# Patient Record
Sex: MALE | Race: WHITE | NOT HISPANIC OR LATINO | Employment: UNEMPLOYED | URBAN - METROPOLITAN AREA
[De-identification: names, ages, dates, MRNs, and addresses within clinical notes are randomized per-mention and may not be internally consistent; named-entity substitution may affect disease eponyms.]

---
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
## 2020-11-07 ENCOUNTER — OFFICE VISIT (OUTPATIENT)
Dept: URGENT CARE | Facility: CLINIC | Age: 20
End: 2020-11-07
Payer: COMMERCIAL

## 2020-11-07 VITALS
HEIGHT: 74 IN | SYSTOLIC BLOOD PRESSURE: 106 MMHG | BODY MASS INDEX: 20.53 KG/M2 | HEART RATE: 60 BPM | TEMPERATURE: 97.8 F | WEIGHT: 160 LBS | RESPIRATION RATE: 16 BRPM | DIASTOLIC BLOOD PRESSURE: 71 MMHG | OXYGEN SATURATION: 100 %

## 2020-11-07 DIAGNOSIS — R19.7 DIARRHEA, UNSPECIFIED TYPE: ICD-10-CM

## 2020-11-07 DIAGNOSIS — B34.9 VIRAL INFECTION: Primary | ICD-10-CM

## 2020-11-07 PROCEDURE — U0003 INFECTIOUS AGENT DETECTION BY NUCLEIC ACID (DNA OR RNA); SEVERE ACUTE RESPIRATORY SYNDROME CORONAVIRUS 2 (SARS-COV-2) (CORONAVIRUS DISEASE [COVID-19]), AMPLIFIED PROBE TECHNIQUE, MAKING USE OF HIGH THROUGHPUT TECHNOLOGIES AS DESCRIBED BY CMS-2020-01-R: HCPCS | Performed by: NURSE PRACTITIONER

## 2020-11-07 PROCEDURE — 99213 OFFICE O/P EST LOW 20 MIN: CPT | Performed by: NURSE PRACTITIONER

## 2020-11-07 RX ORDER — ESCITALOPRAM OXALATE 10 MG/1
10 TABLET ORAL DAILY
COMMUNITY
Start: 2020-10-08

## 2020-11-09 LAB — SARS-COV-2 RNA SPEC QL NAA+PROBE: DETECTED

## 2020-11-12 ENCOUNTER — TELEPHONE (OUTPATIENT)
Dept: URGENT CARE | Facility: CLINIC | Age: 20
End: 2020-11-12

## 2020-11-16 ENCOUNTER — TELEPHONE (OUTPATIENT)
Dept: URGENT CARE | Facility: CLINIC | Age: 20
End: 2020-11-16

## 2022-03-19 ENCOUNTER — OFFICE VISIT (OUTPATIENT)
Dept: URGENT CARE | Facility: CLINIC | Age: 22
End: 2022-03-19
Payer: COMMERCIAL

## 2022-03-19 VITALS — OXYGEN SATURATION: 95 % | HEART RATE: 100 BPM | RESPIRATION RATE: 18 BRPM | TEMPERATURE: 101.3 F

## 2022-03-19 DIAGNOSIS — R05.1 ACUTE COUGH: Primary | ICD-10-CM

## 2022-03-19 DIAGNOSIS — R50.9 FEVER, UNSPECIFIED FEVER CAUSE: ICD-10-CM

## 2022-03-19 DIAGNOSIS — R11.2 NON-INTRACTABLE VOMITING WITH NAUSEA, UNSPECIFIED VOMITING TYPE: ICD-10-CM

## 2022-03-19 PROCEDURE — 99213 OFFICE O/P EST LOW 20 MIN: CPT

## 2022-03-19 PROCEDURE — 87636 SARSCOV2 & INF A&B AMP PRB: CPT

## 2022-03-19 PROCEDURE — S9088 SERVICES PROVIDED IN URGENT: HCPCS

## 2022-03-19 PROCEDURE — 96374 THER/PROPH/DIAG INJ IV PUSH: CPT

## 2022-03-19 RX ORDER — ONDANSETRON 4 MG/1
4 TABLET, ORALLY DISINTEGRATING ORAL EVERY 6 HOURS PRN
Qty: 20 TABLET | Refills: 0 | Status: SHIPPED | OUTPATIENT
Start: 2022-03-19

## 2022-03-19 RX ORDER — KETOROLAC TROMETHAMINE 30 MG/ML
30 INJECTION, SOLUTION INTRAMUSCULAR; INTRAVENOUS ONCE
Status: COMPLETED | OUTPATIENT
Start: 2022-03-19 | End: 2022-03-19

## 2022-03-19 RX ORDER — BENZONATATE 200 MG/1
200 CAPSULE ORAL 3 TIMES DAILY PRN
Qty: 20 CAPSULE | Refills: 0 | Status: SHIPPED | OUTPATIENT
Start: 2022-03-19

## 2022-03-19 RX ADMIN — KETOROLAC TROMETHAMINE 30 MG: 30 INJECTION, SOLUTION INTRAMUSCULAR; INTRAVENOUS at 10:04

## 2022-03-19 NOTE — PATIENT INSTRUCTIONS
Toradol given- this is an NSAID  Use benzonatate as directed as needed for cough  Hydration and rest   Acetaminophen and ibuprofen for pain and fever  COVID/influenza testing  Use the [facility] MyChart to obtain lab results  Home isolation  Wear your mask and wash hands often  PCP follow up in 3-5 days  Go to an emergency department if difficulty breathing occurs or if symptoms worsen  Influenza   AMBULATORY CARE:   Influenza  (the flu) is an infection caused by the influenza virus  The flu is easily spread when an infected person coughs, sneezes, or has close contact with others  You may be able to spread the flu to others for 1 week or longer after signs or symptoms appear  Common signs and symptoms include the following:   · Fever and chills    · Headaches, body aches, and muscle or joint pain    · Cough, runny nose, and sore throat    · Loss of appetite, nausea, vomiting, or diarrhea    · Tiredness    · Trouble breathing    Call your local emergency number (911 in the [address] [facility]) if:   · You have trouble breathing, and your lips look purple or blue  · You have a seizure  Seek care immediately if:   · You are dizzy, or you are urinating less or not at all  · You have a headache with a stiff neck, and you feel tired or confused  · You have new pain or pressure in your chest     · Your symptoms, such as shortness of breath, vomiting, or diarrhea, get worse  · Your symptoms, such as fever and coughing, seem to get better, but then get worse  Call your doctor if:   · You have new muscle pain or weakness  · You have questions or concerns about your condition or care  Treatment for influenza  may include any of the following:  · Acetaminophen  decreases pain and fever  It is available without a doctor's order  Ask how much to take and how often to take it  Follow directions   Read the labels of all other medicines you are using to see if they also contain acetaminophen, or ask your doctor or pharmacist  Acetaminophen can cause liver damage if not taken correctly  Do not use more than 4 grams (4,000 milligrams) total of acetaminophen in one day  · NSAIDs , such as ibuprofen, help decrease swelling, pain, and fever  This medicine is available with or without a doctor's order  NSAIDs can cause stomach bleeding or kidney problems in certain people  If you take blood thinner medicine, always ask your healthcare provider if NSAIDs are safe for you  Always read the medicine label and follow directions  · Antivirals  help fight a viral infection  Manage your symptoms:   · Rest  as much as you can to help you recover  · Drink liquids as directed  to help prevent dehydration  Ask how much liquid to drink each day and which liquids are best for you  Prevent the spread of germs:       · Wash your hands often  Wash your hands several times each day  Wash after you use the bathroom, change a child's diaper, and before you prepare or eat food  Use soap and water every time  Rub your soapy hands together, lacing your fingers  Wash the front and back of your hands, and in between your fingers  Use the fingers of one hand to scrub under the fingernails of the other hand  Wash for at least 20 seconds  Rinse with warm, running water for several seconds  Then dry your hands with a clean towel or paper towel  Use hand  that contains alcohol if soap and water are not available  Do not touch your eyes, nose, or mouth without washing your hands first          · Cover a sneeze or cough  Use a tissue that covers your mouth and nose  Throw the tissue away in a trash can right away  Use the bend of your arm if a tissue is not available  Wash your hands well with soap and water or use a hand   · Stay away from others while you are sick  Avoid crowds as much as possible  · Ask about vaccines you may need  Talk to your healthcare provider about your vaccine history   He or she will tell you which vaccines you need, and when to get them  ? Get the influenza (flu) vaccine as soon as it is available  Flu viruses change, so it is important to get a flu vaccine every year  ? Get the pneumonia vaccine if recommended  This vaccine is usually recommended every 5 years  Your provider will tell you when to get this vaccine, if needed  Follow up with your doctor as directed:  Write down your questions so you remember to ask them during your visits  © Copyright [employer] 2022 Information is for End User's use only and may not be sold, redistributed or otherwise used for commercial purposes  All illustrations and images included in CareNotes® are the copyrighted property of A D A M , Inc  or [facility] Laurie Bobo   The above information is an  only  It is not intended as medical advice for individual conditions or treatments  Talk to your doctor, nurse or pharmacist before following any medical regimen to see if it is safe and effective for you

## 2022-03-19 NOTE — LETTER
March 19, 2022     Patient: Jesica Parker   YOB: 2000   Date of Visit: 3/19/2022       To Whom It May Concern:      Jesica Parker was seen and evaluated at our [facility]  Please note if Covid/influenza test is negative, they may return to work when fever free for 24 hours without the use of a fever reducing agent  If Covid/influenza test is positive, they may return to work on 03/22/222, as this is 5 days from the onset of symptoms  Upon return, they must then adhere to strict masking for an additional 5 days  If you have any questions or concerns, please don't hesitate to call           Sincerely,        KAZ Bhatia    CC: No Recipients

## 2022-03-19 NOTE — PROGRESS NOTES
[facility]        NAME: Natalie Funes is a 24 y o  male  : 2000    MRN: [id number]  DATE: 2022  TIME: 10:09 AM      Assessment and Plan     Acute cough [R05 1]  1  Acute cough  Covid19 and INFLUENZA A/B PCR    benzonatate (TESSALON) 200 MG capsule   2  Fever, unspecified fever cause  ketorolac (TORADOL) injection 30 mg   3  Non-intractable vomiting with nausea, unspecified vomiting type  ondansetron (Zofran ODT) 4 mg disintegrating tablet     toradol given IM  Patient Instructions     Use benzonatate as directed as needed for cough  Hydration and rest   Acetaminophen and ibuprofen for pain and fever  COVID/influenza testing  Use the [facility] MyChart to obtain lab results  Home isolation  Wear your mask and wash hands often  PCP follow up in 3-5 days  Go to an emergency department if difficulty breathing occurs or if symptoms worsen  Chief Complaint     Chief Complaint   Patient presents with    Cold Like Symptoms     approx  x2 days: dry cough, fever unknown, fatigue/bodyaches, headache, nausea, & vomiting with last time yesterday  History of Present Illness     Patient is a 80-year-old male who presents with mother at bedside  Patient reports  Fatigue, body aches, headache, fever and dry cough for two days  Reports 7-8 episodes of vomiting yesterday, since resolved  Denies abdominal pain  Denies urinary symptoms  States he did not take anything for the fever this morning  Mother at bedside states he was around his sick nieces and nephews  Patient states that he thinks he has food poisoning because he ate something he normally doesn't  Arthur RN or congestion  Denies sore throat  Review of Systems     Review of Systems   Constitutional: Positive for fever  HENT: Negative for congestion, ear pain, rhinorrhea and sore throat  Respiratory: Positive for cough (dry)  Negative for shortness of breath      Gastrointestinal: Positive for nausea and vomiting (last episode last night)  Musculoskeletal: Positive for myalgias  Skin: Negative for rash  Neurological: Positive for headaches  All other systems reviewed and are negative  Current Medications       Current Outpatient Medications:     escitalopram (LEXAPRO) 10 mg tablet, Take 10 mg by mouth daily, Disp: , Rfl:     benzonatate (TESSALON) 200 MG capsule, Take 1 capsule (200 mg total) by mouth 3 (three) times a day as needed for cough, Disp: 20 capsule, Rfl: 0    ondansetron (Zofran ODT) 4 mg disintegrating tablet, Take 1 tablet (4 mg total) by mouth every 6 (six) hours as needed for nausea or vomiting, Disp: 20 tablet, Rfl: 0  No current facility-administered medications for this visit  Current Allergies     Allergies as of 03/19/2022    (No Known Allergies)              The following portions of the patient's history were reviewed and updated as appropriate: allergies, current medications, past family history, past medical history, past social history, past surgical history and problem list      Past Medical History:   Diagnosis Date    Depression        Past Surgical History:   Procedure Laterality Date    APPENDECTOMY         History reviewed  No pertinent family history  Medications have been verified  Objective     Pulse 100   Temp (!) 101 3 °F (38 5 °C)   Resp 18   SpO2 95%   No LMP for male patient  Physical Exam     Physical Exam  Vitals and nursing note reviewed  Constitutional:       General: He is awake  He is not in acute distress  Appearance: He is not ill-appearing, toxic-appearing or diaphoretic  HENT:      Right Ear: Tympanic membrane, ear canal and external ear normal  Tympanic membrane is not injected or erythematous  Left Ear: Tympanic membrane, ear canal and external ear normal  Tympanic membrane is not injected or erythematous  Nose: No mucosal edema, congestion or rhinorrhea        Right Sinus: No maxillary sinus tenderness or frontal sinus tenderness  Left Sinus: No maxillary sinus tenderness or frontal sinus tenderness  Mouth/Throat:      Lips: Pink  Mouth: Mucous membranes are moist       Pharynx: Oropharynx is clear  Uvula midline  No pharyngeal swelling, oropharyngeal exudate, posterior oropharyngeal erythema or uvula swelling  Cardiovascular:      Rate and Rhythm: Normal rate  Pulses: Normal pulses  Heart sounds: Normal heart sounds, S1 normal and S2 normal    Pulmonary:      Effort: Pulmonary effort is normal       Breath sounds: Normal breath sounds and air entry  No stridor, decreased air movement or transmitted upper airway sounds  No decreased breath sounds  Abdominal:      General: Abdomen is flat  Bowel sounds are normal       Palpations: Abdomen is soft  Tenderness: There is no abdominal tenderness  Skin:     General: Skin is warm  Capillary Refill: Capillary refill takes less than 2 seconds  Neurological:      Mental Status: He is alert

## 2022-03-20 LAB
FLUAV RNA RESP QL NAA+PROBE: POSITIVE
FLUBV RNA RESP QL NAA+PROBE: NEGATIVE
SARS-COV-2 RNA RESP QL NAA+PROBE: NEGATIVE

## 2022-10-14 ENCOUNTER — OFFICE VISIT (OUTPATIENT)
Dept: URGENT CARE | Facility: CLINIC | Age: 22
End: 2022-10-14
Payer: COMMERCIAL

## 2022-10-14 VITALS
HEART RATE: 80 BPM | TEMPERATURE: 97.6 F | OXYGEN SATURATION: 97 % | RESPIRATION RATE: 18 BRPM | DIASTOLIC BLOOD PRESSURE: 79 MMHG | SYSTOLIC BLOOD PRESSURE: 116 MMHG

## 2022-10-14 DIAGNOSIS — H61.22 IMPACTED CERUMEN OF LEFT EAR: Primary | ICD-10-CM

## 2022-10-14 PROCEDURE — S9088 SERVICES PROVIDED IN URGENT: HCPCS | Performed by: PHYSICIAN ASSISTANT

## 2022-10-14 PROCEDURE — 99213 OFFICE O/P EST LOW 20 MIN: CPT | Performed by: PHYSICIAN ASSISTANT

## 2022-10-14 PROCEDURE — 69209 REMOVE IMPACTED EAR WAX UNI: CPT | Performed by: PHYSICIAN ASSISTANT

## 2022-10-14 NOTE — PROGRESS NOTES
[facility]    NAME: Yris Dowling is a 25 y o  male  : 2000    MRN: [id number]  DATE: 2022  TIME: 11:48 AM    Assessment and Plan   Impacted cerumen of left ear [H61 22]  1  Impacted cerumen of left ear  Ear cerumen removal   Ear cerumen removal    Date/Time: 10/14/2022 11:47 AM  Performed by: Delicia Hutchins PA-C  Authorized by: Delicia Hutchins PA-C   Universal Protocol:  Consent given by: patient      Patient location:  Clinic  Procedure details:     Location:  L ear    Procedure type: irrigation only      Approach:  External  Post-procedure details:     Complication:  None    Hearing quality:  Improved    Patient tolerance of procedure: Tolerated well, no immediate complications        Patient Instructions   Patient Instructions   Over the counter swim ear drops to dry the ear canal   Follow up as needed  Chief Complaint     Chief Complaint   Patient presents with   • Ear Fullness     Pt c/o left ear fullness for two days  History of Present Illness   25year old male here with complaint of left ear feeling clogged  Decreased hearing in that ear  No pain  Review of Systems   Review of Systems   Constitutional: Negative for chills, fatigue and fever  HENT: Positive for hearing loss (fullness left ear)  Negative for congestion, ear pain, postnasal drip, sinus pressure and sore throat  Respiratory: Negative for cough, shortness of breath and wheezing  Neurological: Negative for headaches  All other systems reviewed and are negative        Current Medications     Current Outpatient Medications:   •  benzonatate (TESSALON) 200 MG capsule, Take 1 capsule (200 mg total) by mouth 3 (three) times a day as needed for cough (Patient not taking: Reported on 10/14/2022), Disp: 20 capsule, Rfl: 0  •  escitalopram (LEXAPRO) 10 mg tablet, Take 10 mg by mouth daily, Disp: , Rfl:   •  ondansetron (Zofran ODT) 4 mg disintegrating tablet, Take 1 tablet (4 mg total) by mouth every 6 (six) hours as needed for nausea or vomiting (Patient not taking: Reported on 10/14/2022), Disp: 20 tablet, Rfl: 0    Current Allergies     Allergies as of 10/14/2022   • (No Known Allergies)          The following portions of the patient's history were reviewed and updated as appropriate: allergies, current medications, past family history, past medical history, past social history, past surgical history and problem list    Past Medical History:   Diagnosis Date   • Depression      Past Surgical History:   Procedure Laterality Date   • APPENDECTOMY       No family history on file  Social History     Socioeconomic History   • Marital status: Single     Spouse name: Not on file   • Number of children: Not on file   • Years of education: Not on file   • Highest education level: Not on file   Occupational History   • Not on file   Tobacco Use   • Smoking status: Never Smoker   • Smokeless tobacco: Never Used   Substance and Sexual Activity   • Alcohol use: No   • Drug use: No   • Sexual activity: Not on file   Other Topics Concern   • Not on file   Social History Narrative   • Not on file     Social Determinants of Health     Financial Resource Strain: Not on file   Food Insecurity: Not on file   Transportation Needs: Not on file   Physical Activity: Not on file   Stress: Not on file   Social Connections: Not on file   Intimate Partner Violence: Not on file   Housing Stability: Not on file     Medications have been verified  Objective   /79   Pulse 80   Temp 97 6 °F (36 4 °C)   Resp 18   SpO2 97%      Physical Exam   Physical Exam  Vitals and nursing note reviewed  Constitutional:       General: He is not in acute distress  Appearance: He is well-developed  HENT:      Head: Normocephalic and atraumatic  Right Ear: Tympanic membrane normal       Left Ear: Tympanic membrane normal  There is impacted cerumen (TM appeared normal after removal)  Nose: No mucosal edema  Cardiovascular:      Rate and Rhythm: Normal rate and regular rhythm  Heart sounds: Normal heart sounds  Pulmonary:      Effort: Pulmonary effort is normal  No respiratory distress  Breath sounds: Normal breath sounds

## 2023-02-01 ENCOUNTER — APPOINTMENT (OUTPATIENT)
Dept: LAB | Facility: CLINIC | Age: 23
End: 2023-02-01
Payer: COMMERCIAL

## 2023-02-01 DIAGNOSIS — Z13.21 ENCOUNTER FOR SCREENING FOR NUTRITIONAL DISORDER: ICD-10-CM

## 2023-02-01 DIAGNOSIS — Z13.6 SCREENING FOR CARDIOVASCULAR CONDITION: ICD-10-CM

## 2023-02-01 DIAGNOSIS — Z00.00 ROUTINE GENERAL MEDICAL EXAMINATION AT A HEALTH CARE FACILITY: ICD-10-CM

## 2023-02-01 LAB
25(OH)D3 SERPL-MCNC: 13.6 NG/ML (ref 30–100)
ALBUMIN SERPL BCP-MCNC: 4.5 G/DL (ref 3.5–5)
ALP SERPL-CCNC: 76 U/L (ref 46–116)
ALT SERPL W P-5'-P-CCNC: 18 U/L (ref 12–78)
ANION GAP SERPL CALCULATED.3IONS-SCNC: 8 MMOL/L (ref 4–13)
AST SERPL W P-5'-P-CCNC: 22 U/L (ref 5–45)
BASOPHILS # BLD AUTO: 0.03 THOUSANDS/ÂΜL (ref 0–0.1)
BASOPHILS NFR BLD AUTO: 1 % (ref 0–1)
BILIRUB SERPL-MCNC: 0.5 MG/DL (ref 0.2–1)
BUN SERPL-MCNC: 11 MG/DL (ref 5–25)
CALCIUM SERPL-MCNC: 9.4 MG/DL (ref 8.3–10.1)
CHLORIDE SERPL-SCNC: 107 MMOL/L (ref 96–108)
CHOLEST SERPL-MCNC: 121 MG/DL
CO2 SERPL-SCNC: 25 MMOL/L (ref 21–32)
CREAT SERPL-MCNC: 0.89 MG/DL (ref 0.6–1.3)
EOSINOPHIL # BLD AUTO: 0.15 THOUSAND/ÂΜL (ref 0–0.61)
EOSINOPHIL NFR BLD AUTO: 3 % (ref 0–6)
ERYTHROCYTE [DISTWIDTH] IN BLOOD BY AUTOMATED COUNT: 12.8 % (ref 11.6–15.1)
GFR SERPL CREATININE-BSD FRML MDRD: 121 ML/MIN/1.73SQ M
GLUCOSE P FAST SERPL-MCNC: 84 MG/DL (ref 65–99)
HCT VFR BLD AUTO: 40.9 % (ref 36.5–49.3)
HDLC SERPL-MCNC: 43 MG/DL
HGB BLD-MCNC: 13.4 G/DL (ref 12–17)
IMM GRANULOCYTES # BLD AUTO: 0.01 THOUSAND/UL (ref 0–0.2)
IMM GRANULOCYTES NFR BLD AUTO: 0 % (ref 0–2)
LDLC SERPL CALC-MCNC: 63 MG/DL (ref 0–100)
LYMPHOCYTES # BLD AUTO: 1.64 THOUSANDS/ÂΜL (ref 0.6–4.47)
LYMPHOCYTES NFR BLD AUTO: 37 % (ref 14–44)
MCH RBC QN AUTO: 27.6 PG (ref 26.8–34.3)
MCHC RBC AUTO-ENTMCNC: 32.8 G/DL (ref 31.4–37.4)
MCV RBC AUTO: 84 FL (ref 82–98)
MONOCYTES # BLD AUTO: 0.42 THOUSAND/ÂΜL (ref 0.17–1.22)
MONOCYTES NFR BLD AUTO: 10 % (ref 4–12)
NEUTROPHILS # BLD AUTO: 2.18 THOUSANDS/ÂΜL (ref 1.85–7.62)
NEUTS SEG NFR BLD AUTO: 49 % (ref 43–75)
NONHDLC SERPL-MCNC: 78 MG/DL
NRBC BLD AUTO-RTO: 0 /100 WBCS
PLATELET # BLD AUTO: 255 THOUSANDS/UL (ref 149–390)
PMV BLD AUTO: 10.6 FL (ref 8.9–12.7)
POTASSIUM SERPL-SCNC: 4.7 MMOL/L (ref 3.5–5.3)
PROT SERPL-MCNC: 7.2 G/DL (ref 6.4–8.4)
RBC # BLD AUTO: 4.85 MILLION/UL (ref 3.88–5.62)
SODIUM SERPL-SCNC: 140 MMOL/L (ref 135–147)
TRIGL SERPL-MCNC: 74 MG/DL
TSH SERPL DL<=0.05 MIU/L-ACNC: 1.15 UIU/ML (ref 0.45–4.5)
VIT B12 SERPL-MCNC: 379 PG/ML (ref 100–900)
WBC # BLD AUTO: 4.43 THOUSAND/UL (ref 4.31–10.16)

## 2023-02-01 PROCEDURE — 85025 COMPLETE CBC W/AUTO DIFF WBC: CPT

## 2023-02-01 PROCEDURE — 82607 VITAMIN B-12: CPT

## 2023-02-01 PROCEDURE — 36415 COLL VENOUS BLD VENIPUNCTURE: CPT

## 2023-02-01 PROCEDURE — 84443 ASSAY THYROID STIM HORMONE: CPT

## 2023-02-01 PROCEDURE — 80061 LIPID PANEL: CPT

## 2023-02-01 PROCEDURE — 80053 COMPREHEN METABOLIC PANEL: CPT

## 2023-02-01 PROCEDURE — 82306 VITAMIN D 25 HYDROXY: CPT

## 2024-08-29 ENCOUNTER — OCCMED (OUTPATIENT)
Dept: URGENT CARE | Facility: CLINIC | Age: 24
End: 2024-08-29
Payer: OTHER MISCELLANEOUS

## 2024-08-29 DIAGNOSIS — Z48.02 ENCOUNTER FOR REMOVAL OF SUTURES: Primary | ICD-10-CM

## 2024-08-29 DIAGNOSIS — S61.012D: ICD-10-CM

## 2024-08-29 PROCEDURE — G0382 LEV 3 HOSP TYPE B ED VISIT: HCPCS | Performed by: NURSE PRACTITIONER

## 2024-08-29 PROCEDURE — 99283 EMERGENCY DEPT VISIT LOW MDM: CPT | Performed by: NURSE PRACTITIONER

## 2024-12-13 ENCOUNTER — OFFICE VISIT (OUTPATIENT)
Dept: URGENT CARE | Facility: CLINIC | Age: 24
End: 2024-12-13
Payer: COMMERCIAL

## 2024-12-13 VITALS
SYSTOLIC BLOOD PRESSURE: 128 MMHG | WEIGHT: 190 LBS | HEART RATE: 100 BPM | BODY MASS INDEX: 24.38 KG/M2 | OXYGEN SATURATION: 98 % | DIASTOLIC BLOOD PRESSURE: 70 MMHG | TEMPERATURE: 98 F | RESPIRATION RATE: 18 BRPM | HEIGHT: 74 IN

## 2024-12-13 DIAGNOSIS — R68.89 FLU-LIKE SYMPTOMS: ICD-10-CM

## 2024-12-13 DIAGNOSIS — Z20.828 CONTACT WITH AND (SUSPECTED) EXPOSURE TO OTHER VIRAL COMMUNICABLE DISEASES: ICD-10-CM

## 2024-12-13 DIAGNOSIS — J06.9 URI WITH COUGH AND CONGESTION: Primary | ICD-10-CM

## 2024-12-13 PROCEDURE — 87636 SARSCOV2 & INF A&B AMP PRB: CPT | Performed by: NURSE PRACTITIONER

## 2024-12-13 PROCEDURE — S9088 SERVICES PROVIDED IN URGENT: HCPCS | Performed by: NURSE PRACTITIONER

## 2024-12-13 PROCEDURE — 99213 OFFICE O/P EST LOW 20 MIN: CPT | Performed by: NURSE PRACTITIONER

## 2024-12-13 NOTE — PATIENT INSTRUCTIONS
You have flu like symptoms.  You are to rest. Drink gatorade or pedialyte for rehydration.    You are to eat a BRAT diet - bananas, rice, applesauce and toast.  You may try imodium for diarrhea.  Take tylenol or motrin for fever or pain.   You are to download SL mychart for the results in 24-48 hours.  You will be notified if the results are positive.  You are to mask as long as you are coughing, feverish or exhibiting symptoms.    Follow up with your PCP in 2-3 days  Go to the ED if symptoms worsen.       You appear to have covid/symptoms.  You have a covid test pending.  You are to download SL mychart for the results in 24-48 hours.   You will be notified if results are +.    You are to take vitamin C, D3,  plain robitussin for cough.  Do not take cough suppressants; you want to take an expectorant.  Sleep on your stomach.   You are to quarantine as required per CDC guidelines of 24 hours.  Mask as long as you are ill, feverish or coughing.     See your PCP for follow up in 2-3 days.    Go to the ED if symptoms worsen or are severe.     Take the dayquil for symptoms if needed.  Rest, stay hydrated

## 2024-12-13 NOTE — LETTER
December 13, 2024     Patient: Daniel Hunter   YOB: 2000   Date of Visit: 12/13/2024       To Whom It May Concern:    It is my medical opinion that Daniel Hunter may return to work on 12/16/2024 .    If you have any questions or concerns, please don't hesitate to call.         Sincerely,        KAZ Saab    CC: No Recipients

## 2024-12-13 NOTE — PROGRESS NOTES
[facility]        NAME: Daniel Hunter is a 24 y.o. male  : 2000    MRN: [id number]  DATE: 2024  TIME: 9:37 AM    Assessment and Plan   URI with cough and congestion [J06.9]  1. URI with cough and congestion        2. Flu-like symptoms        3. Contact with and (suspected) exposure to other viral communicable diseases  Covid/Flu- Office Collect Normal    Covid/Flu- Office Collect Normal            Patient Instructions       Follow up with PCP in 3-5 days.  Proceed to  ER if symptoms worsen.    If tests have been performed at [facility], our office will contact you with results if changes need to be made to the care plan discussed with you at the visit.  You can review your full results on [facility].    You have flu like symptoms.  You are to rest. Drink gatorade or pedialyte for rehydration.    You are to eat a BRAT diet - bananas, rice, applesauce and toast.  You may try imodium for diarrhea.  Take tylenol or motrin for fever or pain.   You are to download  mychart for the results in 24-48 hours.  You will be notified if the results are positive.  You are to mask as long as you are coughing, feverish or exhibiting symptoms.    Follow up with your PCP in 2-3 days  Go to the ED if symptoms worsen.       You appear to have covid/symptoms.  You have a covid test pending.  You are to download  [employer] for the results in 24-48 hours.   You will be notified if results are +.    You are to take vitamin C, D3,  plain robitussin for cough.  Do not take cough suppressants; you want to take an expectorant.  Sleep on your stomach.   You are to quarantine as required per CDC guidelines of 24 hours.  Mask as long as you are ill, feverish or coughing.     See your PCP for follow up in 2-3 days.    Go to the ED if symptoms worsen or are severe.     Take the dayquil for symptoms if needed.  Rest, stay hydrated          Chief Complaint     Chief Complaint   Patient presents with    Cold  Like Symptoms     Cough, stuffy nose, chest is tight, loss of taste for 3 days. Vomited once 2 days ago         History of Present Illness       This is a 24 year male who comes to care now with c/o 3 days of cold like symptoms with vomiting x 1, chest tightness and loss of taste x 3 days. He has not tested for covid. He did not get flu vaccine this year. Works at [employer].  He took mucinex but does have dayquil at home.  He denies fevers, chills, diarrhea, denies known exposure.  PMH is listed and reviewed.         Review of Systems   Review of Systems   Constitutional:         Loss of taste    HENT:  Positive for congestion and sore throat.    Eyes: Negative.    Respiratory:  Positive for cough and chest tightness.    Cardiovascular: Negative.    Gastrointestinal: Negative.    Endocrine: Negative.    Genitourinary: Negative.    Musculoskeletal: Negative.    Skin: Negative.    Allergic/Immunologic: Negative.    Neurological: Negative.    Hematological: Negative.    Psychiatric/Behavioral: Negative.           Current Medications       Current Outpatient Medications:     benzonatate (TESSALON) 200 MG capsule, Take 1 capsule (200 mg total) by mouth 3 (three) times a day as needed for cough (Patient not taking: Reported on 12/13/2024), Disp: 20 capsule, Rfl: 0    escitalopram (LEXAPRO) 10 mg tablet, Take 10 mg by mouth daily (Patient not taking: Reported on 12/13/2024), Disp: , Rfl:     ondansetron (Zofran ODT) 4 mg disintegrating tablet, Take 1 tablet (4 mg total) by mouth every 6 (six) hours as needed for nausea or vomiting (Patient not taking: Reported on 12/13/2024), Disp: 20 tablet, Rfl: 0    Current Allergies     Allergies as of 12/13/2024    (No Known Allergies)            The following portions of the patient's history were reviewed and updated as appropriate: allergies, current medications, past family history, past medical history, past social history, past surgical history and problem list.     Past  "Medical History:   Diagnosis Date    Depression        Past Surgical History:   Procedure Laterality Date    APPENDECTOMY         History reviewed. No pertinent family history.      Medications have been verified.        Objective   /70   Pulse 100   Temp 98 °F (36.7 °C) (Temporal)   Resp 18   Ht 6' 2\" (1.88 m)   Wt 86.2 kg (190 lb)   SpO2 98%   BMI 24.39 kg/m²   No LMP for male patient.       Physical Exam     Physical Exam  Vitals and nursing note reviewed.   Constitutional:       General: He is not in acute distress.     Appearance: Normal appearance. He is normal weight. He is not ill-appearing, toxic-appearing or diaphoretic.   HENT:      Head: Normocephalic and atraumatic.      Right Ear: Tympanic membrane and ear canal normal.      Left Ear: Tympanic membrane and ear canal normal.      Nose: Congestion present. No rhinorrhea.      Mouth/Throat:      Mouth: Mucous membranes are moist.      Pharynx: No oropharyngeal exudate or posterior oropharyngeal erythema.   Eyes:      Extraocular Movements: Extraocular movements intact.   Cardiovascular:      Rate and Rhythm: Normal rate and regular rhythm.      Pulses: Normal pulses.      Heart sounds: Normal heart sounds. No murmur heard.  Pulmonary:      Effort: Pulmonary effort is normal. No respiratory distress.      Breath sounds: Normal breath sounds. No stridor. No wheezing, rhonchi or rales.   Chest:      Chest wall: No tenderness.   Abdominal:      General: There is no distension.      Palpations: Abdomen is soft.      Tenderness: There is no abdominal tenderness.   Musculoskeletal:         General: Normal range of motion.      Cervical back: Normal range of motion and neck supple.   Skin:     General: Skin is warm and dry.      Capillary Refill: Capillary refill takes less than 2 seconds.   Neurological:      General: No focal deficit present.      Mental Status: He is alert and oriented to person, place, and time.   Psychiatric:         Mood and " "Affect: Mood normal.         Behavior: Behavior normal.         Thought Content: Thought content normal.         Judgment: Judgment normal.         COVID-19 in adults - Discharge instructions   The Basics   Written by the doctors and editors at [facility]   What are discharge instructions? -- Discharge instructions are information about how to take care of yourself after getting medical care for a health problem.  What is COVID-19? -- COVID-19 stands for \"coronavirus disease 2019.\" It is caused by a virus called SARS-CoV-2.  The virus that causes COVID-19 mainly spreads from person to person. This usually happens when an infected person coughs, sneezes, or talks near other people. A person can be infected, and spread the virus to others, even without having any symptoms.  How do I care for myself at home? -- Ask the doctor or nurse what you should do when you go home. Make sure that you understand exactly what you need to do to care for yourself. Ask questions if there is anything you do not understand.  You can also:   Follow all instructions for taking your medicines, if your doctor prescribed any.   Drink plenty of fluids, such as water, juice, or broth. This helps replace fluids lost from a fever.   Take acetaminophen (sample brand name: Tylenol) to help reduce a fever. If this does not help, you can try medicines like ibuprofen (sample brand names: Advil, Motrin).   Use a cool mist humidifier. This might make it easier to breathe.   Lower the chance of passing the infection to others:   Stay home while you are feeling sick or have a fever.   At home, try to limit close contact with other people. You can also help protect others by wearing a face mask.   Wash your hands often (figure 1).   Cover your mouth and nose with the inside of your elbow when you cough or sneeze.   Do not go to work or school until your symptoms are improving and your fever has been gone for at least 24 hours without taking medicine such as " acetaminophen.  If you have not already been vaccinated, consider getting a COVID-19 vaccine as soon as you have recovered. Being vaccinated is the best way to protect yourself and others.  When should I call the doctor? -- Call for an ambulance (in the [place] and [place], call 9-1-1) if:   You are having so much trouble breathing that you cannot speak a full sentence.   You are very confused or cannot stay awake.   Your lips or skin start to turn blue.   You think that you might be having a medical emergency - Examples include severe chest pain, feeling extremely weak or like you might pass out, or losing control of your body (like being unable to speak normally or move your arm or leg).  Call your doctor if:   You develop new shortness of breath, or your breathing gets worse (but you can still talk in full sentences).   You become weak or dizzy.   You have very dark urine or do not urinate for more than 8 hours.   You have new or worsening symptoms that concern you - COVID-19 symptoms can include fever, cough, feeling very tired, shaking, chills, headache, and trouble swallowing. They can also include digestive problems like vomiting or diarrhea.  All topics are updated as new evidence becomes available and our peer review process is complete.  This topic retrieved from [employer] on: Mar 13, 2024.  Topic 457009 Version 2.0  Release: 32.2.4 - C32.71  © 2024 UpToDate, Inc. and/or its affiliates. All rights reserved.  figure 1: How to wash your hands     Wet your hands with clean water, and apply a small amount of soap. Lather and rub hands together for at least 20 seconds. Clean your wrists, palms, backs of your hands, between your fingers, tips of your fingers, thumbs, and under and around your nails. Rinse well, and dry your hands using a clean towel.  Graphic 478967 Version 7.0  Consumer Information Use and Disclaimer   Disclaimer: This generalized information is a limited summary of diagnosis, treatment, and/or  "medication information. It is not meant to be comprehensive and should be used as a tool to help the user understand and/or assess potential diagnostic and treatment options. It does NOT include all information about conditions, treatments, medications, side effects, or risks that may apply to a specific patient. It is not intended to be medical advice or a substitute for the medical advice, diagnosis, or treatment of a health care provider based on the health care provider's examination and assessment of a patient's specific and unique circumstances. Patients must speak with a health care provider for complete information about their health, medical questions, and treatment options, including any risks or benefits regarding use of medications. This information does not endorse any treatments or medications as safe, effective, or approved for treating a specific patient. UpToDate, Inc. and its affiliates disclaim any warranty or liability relating to this information or the use thereof.The use of this information is governed by the Terms of Use, available at https://www.Fitbay.com/en/know/clinical-effectiveness-terms. 2024© UpToDate, Inc. and its affiliates and/or licensors. All rights reserved.  Copyright   © 2024 UpToDate, Inc. and/or its affiliates. All rights reserved.         Flu in adults - Discharge instructions   The Basics   Written by the doctors and editors at [facility]   What are discharge instructions? -- Discharge instructions are information about how to take care of yourself after getting medical care for a health problem.  What is the flu? -- This is an infection that can cause fever, cough, body aches, and other symptoms. The most common type of flu is the \"seasonal\" flu. There are different forms of seasonal flu, for example, \"type A\" and \"type B.\" The medical term for the flu is \"influenza.\"  All forms of the flu are caused by viruses. Antibiotics do not work to treat the flu. Doctors might " "prescribe you an \"antiviral\" medicine. If so, follow your doctor's instructions. The flu can be dangerous because it can cause a serious lung infection called pneumonia.  How do I care for myself at home? -- Ask the doctor or nurse what you should do when you go home. Make sure that you understand exactly what you need to do to care for yourself. Ask questions if there is anything you do not understand.  You should also:   Take all of your medicines as instructed, even if you are feeling better.   Get lots of rest. Sleep when you feel tired. Avoid doing tiring activities.   Take warm, steamy showers to help soothe your cough.   Use hard candy or cough drops to soothe a sore throat and cough.   Try to thin mucus:   Drink lots of liquids.   Use a cool mist humidifier, if your doctor told you to. Keep the humidifier clean.   Use saline nose drops to relieve stuffiness.   Take a medicine like acetaminophen (sample brand name: Tylenol) or ibuprofen (sample brand names: Advil, Motrin) to help bring down your fever.   Dress in lightweight clothes if you have a fever. Cover with a light sheet or blanket if needed. This will help keep you from getting too warm.   Lower the chance of passing the infection to others:   Stay home while you are feeling sick or have a fever.   At home, try to limit close contact with other people. You can also help protect others by wearing a face mask.   Wash your hands often (figure 1). Alcohol-based hand sanitizers also work to kill germs.   Cover your mouth and nose with the inside of your elbow when you cough or sneeze.   Avoid touching your eyes, nose, and mouth.   Do not share cups, food, towels, bedding, or other personal items with others.   Clean items and surfaces you often touch. Examples include sinks, counters, tables, door handles, remotes, and phones. Germs can often live on surfaces for a few hours. Use a bleach and water mixture or a cleaning product that gets rid of viruses.   " Do not go to work or school until your symptoms are improving and your fever has been gone for at least 24 hours without taking medicine such as acetaminophen.  It's also important to get a flu vaccine each year. Some years, the flu vaccine is more effective than others. But even in years when it is less effective, it still helps prevent some cases of the flu. It can also help keep you from getting severely ill if you do get the flu.  What follow-up care do I need? -- Your doctor or nurse will tell you if you need to make a follow-up appointment. If so, make sure that you know when and where to go.  When should I call the doctor? -- Call for emergency help right away (in the [place] and [place], call 9-1-1) if you:   Are having so much trouble breathing that you can only say 1 or 2 words at a time   Need to sit upright at all times to be able to breathe, or cannot lie down   Are very tired from working to catch your breath, or are sweating from trying to breathe  Call for advice if you:   Have trouble breathing when talking or sitting still   Have severe chest discomfort   Feel confused or disoriented   Are vomiting and can't keep liquids down   Have early signs of fluid loss, such as:   Dark-colored urine   Dry mouth   Muscle cramps   Lack of energy   Feeling lightheaded when you stand up  All topics are updated as new evidence becomes available and our peer review process is complete.  This topic retrieved from [employer] on: Apr 27, 2024.  Topic 548896 Version 2.0  Release: 32.4.2 - C32.116  © 2024 UpToDate, Inc. and/or its affiliates. All rights reserved.  figure 1: How to wash your hands     Wet your hands with clean water, and apply a small amount of soap. Lather and rub hands together for at least 20 seconds. Clean your wrists, palms, backs of your hands, between your fingers, tips of your fingers, thumbs, and under and around your nails. Rinse well, and dry your hands using a clean towel.  Graphic 809667 Version  7.0  Consumer Information Use and Disclaimer   Disclaimer: This generalized information is a limited summary of diagnosis, treatment, and/or medication information. It is not meant to be comprehensive and should be used as a tool to help the user understand and/or assess potential diagnostic and treatment options. It does NOT include all information about conditions, treatments, medications, side effects, or risks that may apply to a specific patient. It is not intended to be medical advice or a substitute for the medical advice, diagnosis, or treatment of a health care provider based on the health care provider's examination and assessment of a patient's specific and unique circumstances. Patients must speak with a health care provider for complete information about their health, medical questions, and treatment options, including any risks or benefits regarding use of medications. This information does not endorse any treatments or medications as safe, effective, or approved for treating a specific patient. UpToDate, Inc. and its affiliates disclaim any warranty or liability relating to this information or the use thereof.The use of this information is governed by the Terms of Use, available at https://www.woltersIPextremeuwer.com/en/know/clinical-effectiveness-terms. 2024© UpToDate, Inc. and its affiliates and/or licensors. All rights reserved.  Copyright   © 2024 UpToDate, Inc. and/or its affiliates. All rights reserved.

## 2024-12-15 LAB
FLUAV RNA RESP QL NAA+PROBE: NEGATIVE
FLUBV RNA RESP QL NAA+PROBE: NEGATIVE
SARS-COV-2 RNA RESP QL NAA+PROBE: POSITIVE

## 2024-12-16 ENCOUNTER — RESULTS FOLLOW-UP (OUTPATIENT)
Dept: URGENT CARE | Facility: CLINIC | Age: 24
End: 2024-12-16

## 2024-12-16 NOTE — RESULT ENCOUNTER NOTE
Pt returned call.  He was informed of + covid.  He states he will contact his employer.      Please have staff call your patient for follow up

## 2024-12-16 NOTE — RESULT ENCOUNTER NOTE
SARS-CoV-2 Positive Abnormal     INFLUENZA A PCR Negative     INFLUENZA B PCR Negative     LM on VM for pt to return call to discuss lab results      Please have your office call patient for follow up

## 2024-12-23 ENCOUNTER — TELEPHONE (OUTPATIENT)
Dept: GASTROENTEROLOGY | Facility: CLINIC | Age: 24
End: 2024-12-23